# Patient Record
Sex: FEMALE | Race: WHITE | NOT HISPANIC OR LATINO | Employment: OTHER | URBAN - METROPOLITAN AREA
[De-identification: names, ages, dates, MRNs, and addresses within clinical notes are randomized per-mention and may not be internally consistent; named-entity substitution may affect disease eponyms.]

---

## 2017-01-10 ENCOUNTER — CLINIC PROCEDURE ONLY (OUTPATIENT)
Dept: URBAN - METROPOLITAN AREA CLINIC 26 | Facility: CLINIC | Age: 82
End: 2017-01-10

## 2017-01-10 DIAGNOSIS — H35.3231: ICD-10-CM

## 2017-01-10 PROCEDURE — 67028 INJECTION EYE DRUG: CPT

## 2017-01-10 ASSESSMENT — VISUAL ACUITY: OD_CC: 20/200

## 2017-01-10 ASSESSMENT — TONOMETRY
OD_IOP_MMHG: 11
OS_IOP_MMHG: 12

## 2017-02-13 ENCOUNTER — CLINIC PROCEDURE ONLY (OUTPATIENT)
Dept: URBAN - METROPOLITAN AREA CLINIC 26 | Facility: CLINIC | Age: 82
End: 2017-02-13

## 2017-02-13 DIAGNOSIS — H35.3231: ICD-10-CM

## 2017-02-13 PROCEDURE — 67028 INJECTION EYE DRUG: CPT

## 2017-02-13 ASSESSMENT — TONOMETRY: OD_IOP_MMHG: 13

## 2017-02-13 ASSESSMENT — VISUAL ACUITY: OD_CC: 20/200

## 2017-03-21 ENCOUNTER — FOLLOW UP (OUTPATIENT)
Dept: URBAN - METROPOLITAN AREA CLINIC 26 | Facility: CLINIC | Age: 82
End: 2017-03-21

## 2017-03-21 VITALS — HEIGHT: 55 IN | DIASTOLIC BLOOD PRESSURE: 67 MMHG | HEART RATE: 71 BPM | SYSTOLIC BLOOD PRESSURE: 108 MMHG

## 2017-03-21 DIAGNOSIS — H40.9: ICD-10-CM

## 2017-03-21 DIAGNOSIS — H33.322: ICD-10-CM

## 2017-03-21 DIAGNOSIS — H43.811: ICD-10-CM

## 2017-03-21 DIAGNOSIS — H35.3231: ICD-10-CM

## 2017-03-21 DIAGNOSIS — H43.393: ICD-10-CM

## 2017-03-21 DIAGNOSIS — H57.11: ICD-10-CM

## 2017-03-21 PROCEDURE — 67028 INJECTION EYE DRUG: CPT

## 2017-03-21 PROCEDURE — 4177F TALK PT/CRGVR RE AREDS PREV: CPT

## 2017-03-21 PROCEDURE — 92235 FLUORESCEIN ANGRPH MLTIFRAME: CPT

## 2017-03-21 PROCEDURE — 92250 FUNDUS PHOTOGRAPHY W/I&R: CPT

## 2017-03-21 PROCEDURE — 4004F PT TOBACCO SCREEN RCVD TLK: CPT

## 2017-03-21 PROCEDURE — 2019F DILATED MACUL EXAM DONE: CPT

## 2017-03-21 PROCEDURE — G8427 DOCREV CUR MEDS BY ELIG CLIN: HCPCS

## 2017-03-21 PROCEDURE — 92014 COMPRE OPH EXAM EST PT 1/>: CPT

## 2017-03-21 ASSESSMENT — TONOMETRY
OS_IOP_MMHG: 14
OD_IOP_MMHG: 15

## 2017-03-21 ASSESSMENT — VISUAL ACUITY
OS_CC: 20/200
OD_CC: 20/400

## 2017-05-01 ENCOUNTER — CLINIC PROCEDURE ONLY (OUTPATIENT)
Dept: URBAN - METROPOLITAN AREA CLINIC 26 | Facility: CLINIC | Age: 82
End: 2017-05-01

## 2017-05-01 DIAGNOSIS — H35.3231: ICD-10-CM

## 2017-05-01 PROCEDURE — 67028 INJECTION EYE DRUG: CPT

## 2017-05-01 ASSESSMENT — VISUAL ACUITY: OD_CC: 20/400

## 2017-05-01 ASSESSMENT — TONOMETRY: OD_IOP_MMHG: 14

## 2017-10-31 ENCOUNTER — FOLLOW UP (OUTPATIENT)
Dept: URBAN - METROPOLITAN AREA CLINIC 26 | Facility: CLINIC | Age: 82
End: 2017-10-31

## 2017-10-31 VITALS — HEART RATE: 61 BPM | HEIGHT: 55 IN | SYSTOLIC BLOOD PRESSURE: 105 MMHG | DIASTOLIC BLOOD PRESSURE: 75 MMHG

## 2017-10-31 DIAGNOSIS — H43.393: ICD-10-CM

## 2017-10-31 DIAGNOSIS — H40.9: ICD-10-CM

## 2017-10-31 DIAGNOSIS — H35.3231: ICD-10-CM

## 2017-10-31 DIAGNOSIS — H33.322: ICD-10-CM

## 2017-10-31 DIAGNOSIS — H57.11: ICD-10-CM

## 2017-10-31 DIAGNOSIS — H43.811: ICD-10-CM

## 2017-10-31 PROCEDURE — 92134 CPTRZ OPH DX IMG PST SGM RTA: CPT

## 2017-10-31 PROCEDURE — 4177F TALK PT/CRGVR RE AREDS PREV: CPT

## 2017-10-31 PROCEDURE — G8427 DOCREV CUR MEDS BY ELIG CLIN: HCPCS

## 2017-10-31 PROCEDURE — 2019F DILATED MACUL EXAM DONE: CPT

## 2017-10-31 PROCEDURE — 67028 INJECTION EYE DRUG: CPT

## 2017-10-31 PROCEDURE — 92250 FUNDUS PHOTOGRAPHY W/I&R: CPT

## 2017-10-31 PROCEDURE — 4004F PT TOBACCO SCREEN RCVD TLK: CPT

## 2017-10-31 PROCEDURE — G8420 CALC BMI NORM PARAMETERS: HCPCS

## 2017-10-31 PROCEDURE — 92235 FLUORESCEIN ANGRPH MLTIFRAME: CPT

## 2017-10-31 PROCEDURE — 92014 COMPRE OPH EXAM EST PT 1/>: CPT

## 2017-10-31 ASSESSMENT — TONOMETRY
OD_IOP_MMHG: 10
OS_IOP_MMHG: 9

## 2017-10-31 ASSESSMENT — VISUAL ACUITY
OS_CC: 20/200-2
OD_CC: 20/400-2

## 2017-12-06 ENCOUNTER — CLINIC PROCEDURE ONLY (OUTPATIENT)
Dept: URBAN - METROPOLITAN AREA CLINIC 26 | Facility: CLINIC | Age: 82
End: 2017-12-06

## 2017-12-06 DIAGNOSIS — H35.3231: ICD-10-CM

## 2017-12-06 PROCEDURE — 67028 INJECTION EYE DRUG: CPT

## 2017-12-06 ASSESSMENT — VISUAL ACUITY: OD_CC: 20/400

## 2017-12-06 ASSESSMENT — TONOMETRY: OD_IOP_MMHG: 14

## 2018-01-11 ENCOUNTER — CLINIC PROCEDURE ONLY (OUTPATIENT)
Dept: URBAN - METROPOLITAN AREA CLINIC 26 | Facility: CLINIC | Age: 83
End: 2018-01-11

## 2018-01-11 DIAGNOSIS — H35.3231: ICD-10-CM

## 2018-01-11 PROCEDURE — 67028 INJECTION EYE DRUG: CPT

## 2018-01-11 ASSESSMENT — VISUAL ACUITY: OD_CC: 20/400-1

## 2018-01-11 ASSESSMENT — TONOMETRY: OD_IOP_MMHG: 14

## 2018-02-12 ENCOUNTER — CLINIC PROCEDURE ONLY (OUTPATIENT)
Dept: URBAN - METROPOLITAN AREA CLINIC 26 | Facility: CLINIC | Age: 83
End: 2018-02-12

## 2018-02-12 DIAGNOSIS — H35.3231: ICD-10-CM

## 2018-02-12 PROCEDURE — 67028 INJECTION EYE DRUG: CPT

## 2018-02-12 ASSESSMENT — TONOMETRY
OS_IOP_MMHG: 11
OD_IOP_MMHG: 11

## 2018-02-12 ASSESSMENT — VISUAL ACUITY: OD_SC: 20/400

## 2018-04-03 ENCOUNTER — CLINIC PROCEDURE ONLY (OUTPATIENT)
Dept: URBAN - METROPOLITAN AREA CLINIC 26 | Facility: CLINIC | Age: 83
End: 2018-04-03

## 2018-04-03 DIAGNOSIS — H35.3231: ICD-10-CM

## 2018-04-03 PROCEDURE — 67028 INJECTION EYE DRUG: CPT

## 2018-04-03 ASSESSMENT — TONOMETRY
OS_IOP_MMHG: 12
OD_IOP_MMHG: 12

## 2018-04-03 ASSESSMENT — VISUAL ACUITY: OD_CC: 20/400

## 2018-05-10 ENCOUNTER — FOLLOW UP AND POST INJECTION EVALUATION (OUTPATIENT)
Dept: URBAN - METROPOLITAN AREA CLINIC 26 | Facility: CLINIC | Age: 83
End: 2018-05-10

## 2018-05-10 VITALS — WEIGHT: 139 LBS | HEIGHT: 69 IN | BODY MASS INDEX: 20.59 KG/M2

## 2018-05-10 DIAGNOSIS — H35.3231: ICD-10-CM

## 2018-05-10 DIAGNOSIS — H43.393: ICD-10-CM

## 2018-05-10 DIAGNOSIS — H04.123: ICD-10-CM

## 2018-05-10 DIAGNOSIS — H02.836: ICD-10-CM

## 2018-05-10 DIAGNOSIS — H33.322: ICD-10-CM

## 2018-05-10 DIAGNOSIS — H40.9: ICD-10-CM

## 2018-05-10 DIAGNOSIS — H02.833: ICD-10-CM

## 2018-05-10 DIAGNOSIS — H43.811: ICD-10-CM

## 2018-05-10 PROCEDURE — 92014 COMPRE OPH EXAM EST PT 1/>: CPT

## 2018-05-10 PROCEDURE — 92250 FUNDUS PHOTOGRAPHY W/I&R: CPT

## 2018-05-10 PROCEDURE — 92134 CPTRZ OPH DX IMG PST SGM RTA: CPT

## 2018-05-10 PROCEDURE — 67028 INJECTION EYE DRUG: CPT

## 2018-05-10 ASSESSMENT — TONOMETRY
OD_IOP_MMHG: 14
OS_IOP_MMHG: 14

## 2018-05-10 ASSESSMENT — VISUAL ACUITY
OS_CC: 20/200-1
OD_CC: 20/400+1

## 2018-11-13 ENCOUNTER — FOLLOW UP (OUTPATIENT)
Dept: URBAN - METROPOLITAN AREA CLINIC 26 | Facility: CLINIC | Age: 83
End: 2018-11-13

## 2018-11-13 VITALS — DIASTOLIC BLOOD PRESSURE: 80 MMHG | HEART RATE: 71 BPM | HEIGHT: 55 IN | SYSTOLIC BLOOD PRESSURE: 110 MMHG

## 2018-11-13 DIAGNOSIS — H43.811: ICD-10-CM

## 2018-11-13 DIAGNOSIS — H35.3231: ICD-10-CM

## 2018-11-13 DIAGNOSIS — H43.393: ICD-10-CM

## 2018-11-13 DIAGNOSIS — H02.836: ICD-10-CM

## 2018-11-13 DIAGNOSIS — H40.9: ICD-10-CM

## 2018-11-13 DIAGNOSIS — H33.322: ICD-10-CM

## 2018-11-13 DIAGNOSIS — H04.123: ICD-10-CM

## 2018-11-13 DIAGNOSIS — H02.833: ICD-10-CM

## 2018-11-13 PROCEDURE — 67028 INJECTION EYE DRUG: CPT

## 2018-11-13 PROCEDURE — 92235 FLUORESCEIN ANGRPH MLTIFRAME: CPT

## 2018-11-13 PROCEDURE — 92014 COMPRE OPH EXAM EST PT 1/>: CPT

## 2018-11-13 PROCEDURE — 92250 FUNDUS PHOTOGRAPHY W/I&R: CPT

## 2018-11-13 PROCEDURE — 92134 CPTRZ OPH DX IMG PST SGM RTA: CPT

## 2018-11-13 ASSESSMENT — VISUAL ACUITY
OD_CC: 20/400-2
OS_CC: 20/400+2

## 2018-11-13 ASSESSMENT — TONOMETRY
OD_IOP_MMHG: 12
OS_IOP_MMHG: 12

## 2018-12-18 ENCOUNTER — CLINICAL PROCEDURE AND DIAGNOSTIC TESTING ONLY (OUTPATIENT)
Dept: URBAN - METROPOLITAN AREA CLINIC 26 | Facility: CLINIC | Age: 83
End: 2018-12-18

## 2018-12-18 DIAGNOSIS — H35.3231: ICD-10-CM

## 2018-12-18 PROCEDURE — 67028 INJECTION EYE DRUG: CPT

## 2018-12-18 PROCEDURE — 92250 FUNDUS PHOTOGRAPHY W/I&R: CPT

## 2018-12-18 ASSESSMENT — TONOMETRY
OD_IOP_MMHG: 12
OS_IOP_MMHG: 13

## 2018-12-18 ASSESSMENT — VISUAL ACUITY: OD_CC: 20/400

## 2019-01-22 ENCOUNTER — CLINICAL PROCEDURE AND DIAGNOSTIC TESTING ONLY (OUTPATIENT)
Dept: URBAN - METROPOLITAN AREA CLINIC 26 | Facility: CLINIC | Age: 84
End: 2019-01-22

## 2019-01-22 DIAGNOSIS — H35.3231: ICD-10-CM

## 2019-01-22 PROCEDURE — 92134 CPTRZ OPH DX IMG PST SGM RTA: CPT

## 2019-01-22 PROCEDURE — 67028 INJECTION EYE DRUG: CPT

## 2019-01-22 ASSESSMENT — TONOMETRY
OS_IOP_MMHG: 14
OD_IOP_MMHG: 14

## 2019-01-22 ASSESSMENT — VISUAL ACUITY: OD_CC: 20/400

## 2019-02-28 ENCOUNTER — CLINICAL PROCEDURE AND DIAGNOSTIC TESTING ONLY (OUTPATIENT)
Dept: URBAN - METROPOLITAN AREA CLINIC 26 | Facility: CLINIC | Age: 84
End: 2019-02-28

## 2019-02-28 DIAGNOSIS — H35.3231: ICD-10-CM

## 2019-02-28 PROCEDURE — 67028 INJECTION EYE DRUG: CPT

## 2019-02-28 PROCEDURE — 92250 FUNDUS PHOTOGRAPHY W/I&R: CPT

## 2019-02-28 ASSESSMENT — VISUAL ACUITY: OD_CC: 20/400-1

## 2019-02-28 ASSESSMENT — TONOMETRY
OD_IOP_MMHG: 14
OS_IOP_MMHG: 15

## 2019-04-04 ENCOUNTER — FOLLOW UP AND POST INJECTION EVALUATION (OUTPATIENT)
Dept: URBAN - METROPOLITAN AREA CLINIC 26 | Facility: CLINIC | Age: 84
End: 2019-04-04

## 2019-04-04 VITALS — BODY MASS INDEX: 19.55 KG/M2 | HEIGHT: 69 IN | WEIGHT: 132 LBS

## 2019-04-04 DIAGNOSIS — H43.393: ICD-10-CM

## 2019-04-04 DIAGNOSIS — H35.3231: ICD-10-CM

## 2019-04-04 DIAGNOSIS — H43.811: ICD-10-CM

## 2019-04-04 DIAGNOSIS — H33.322: ICD-10-CM

## 2019-04-04 DIAGNOSIS — H40.9: ICD-10-CM

## 2019-04-04 PROCEDURE — 92134 CPTRZ OPH DX IMG PST SGM RTA: CPT

## 2019-04-04 PROCEDURE — 92250 FUNDUS PHOTOGRAPHY W/I&R: CPT

## 2019-04-04 PROCEDURE — 67028 INJECTION EYE DRUG: CPT

## 2019-04-04 PROCEDURE — 92014 COMPRE OPH EXAM EST PT 1/>: CPT

## 2019-04-04 ASSESSMENT — TONOMETRY
OS_IOP_MMHG: 14
OD_IOP_MMHG: 12

## 2019-04-04 ASSESSMENT — VISUAL ACUITY
OS_CC: 20/400
OD_CC: 20/400

## 2019-05-08 ENCOUNTER — CLINICAL PROCEDURE AND DIAGNOSTIC TESTING ONLY (OUTPATIENT)
Dept: URBAN - METROPOLITAN AREA CLINIC 26 | Facility: CLINIC | Age: 84
End: 2019-05-08

## 2019-05-08 DIAGNOSIS — H35.3231: ICD-10-CM

## 2019-05-08 PROCEDURE — 67028 INJECTION EYE DRUG: CPT

## 2019-05-08 PROCEDURE — 92250 FUNDUS PHOTOGRAPHY W/I&R: CPT

## 2019-05-08 ASSESSMENT — VISUAL ACUITY: OD_CC: 20/400

## 2019-05-08 ASSESSMENT — TONOMETRY
OD_IOP_MMHG: 13
OS_IOP_MMHG: 12

## 2019-10-30 ENCOUNTER — FOLLOW UP (OUTPATIENT)
Dept: URBAN - METROPOLITAN AREA CLINIC 26 | Facility: CLINIC | Age: 84
End: 2019-10-30

## 2019-10-30 VITALS — DIASTOLIC BLOOD PRESSURE: 77 MMHG | SYSTOLIC BLOOD PRESSURE: 119 MMHG | HEIGHT: 55 IN | HEART RATE: 69 BPM

## 2019-10-30 DIAGNOSIS — H33.322: ICD-10-CM

## 2019-10-30 DIAGNOSIS — H40.9: ICD-10-CM

## 2019-10-30 DIAGNOSIS — H35.3231: ICD-10-CM

## 2019-10-30 DIAGNOSIS — H43.393: ICD-10-CM

## 2019-10-30 DIAGNOSIS — H43.811: ICD-10-CM

## 2019-10-30 PROCEDURE — 92250 FUNDUS PHOTOGRAPHY W/I&R: CPT

## 2019-10-30 PROCEDURE — 67028 INJECTION EYE DRUG: CPT

## 2019-10-30 PROCEDURE — 92235 FLUORESCEIN ANGRPH MLTIFRAME: CPT

## 2019-10-30 PROCEDURE — 92134 CPTRZ OPH DX IMG PST SGM RTA: CPT

## 2019-10-30 PROCEDURE — 92014 COMPRE OPH EXAM EST PT 1/>: CPT

## 2019-10-30 ASSESSMENT — VISUAL ACUITY
OD_CC: 20/400
OS_CC: 20/400+2

## 2019-10-30 ASSESSMENT — TONOMETRY
OD_IOP_MMHG: 14
OS_IOP_MMHG: 13

## 2019-12-11 ENCOUNTER — CLINICAL PROCEDURE AND DIAGNOSTIC TESTING ONLY (OUTPATIENT)
Dept: URBAN - METROPOLITAN AREA CLINIC 26 | Facility: CLINIC | Age: 84
End: 2019-12-11

## 2019-12-11 DIAGNOSIS — H35.3231: ICD-10-CM

## 2019-12-11 PROCEDURE — 92250 FUNDUS PHOTOGRAPHY W/I&R: CPT

## 2019-12-11 PROCEDURE — 67028 INJECTION EYE DRUG: CPT

## 2019-12-11 ASSESSMENT — TONOMETRY: OD_IOP_MMHG: 13

## 2019-12-11 ASSESSMENT — VISUAL ACUITY: OD_CC: 20/400

## 2020-01-22 ENCOUNTER — CLINICAL PROCEDURE AND DIAGNOSTIC TESTING ONLY (OUTPATIENT)
Dept: URBAN - METROPOLITAN AREA CLINIC 26 | Facility: CLINIC | Age: 85
End: 2020-01-22

## 2020-01-22 DIAGNOSIS — H35.3231: ICD-10-CM

## 2020-01-22 PROCEDURE — 67028 INJECTION EYE DRUG: CPT

## 2020-01-22 PROCEDURE — 92250 FUNDUS PHOTOGRAPHY W/I&R: CPT

## 2020-01-22 ASSESSMENT — TONOMETRY
OS_IOP_MMHG: 16
OD_IOP_MMHG: 19

## 2020-01-22 ASSESSMENT — VISUAL ACUITY: OD_CC: 20/400+2

## 2020-03-04 ENCOUNTER — FOLLOW UP AND POST INJECTION EVALUATION (OUTPATIENT)
Dept: URBAN - METROPOLITAN AREA CLINIC 26 | Facility: CLINIC | Age: 85
End: 2020-03-04

## 2020-03-04 DIAGNOSIS — H35.3231: ICD-10-CM

## 2020-03-04 PROCEDURE — 67028 INJECTION EYE DRUG: CPT

## 2020-03-04 PROCEDURE — 92250 FUNDUS PHOTOGRAPHY W/I&R: CPT

## 2020-03-04 PROCEDURE — 92134 CPTRZ OPH DX IMG PST SGM RTA: CPT

## 2020-03-04 ASSESSMENT — TONOMETRY
OS_IOP_MMHG: 9
OD_IOP_MMHG: 10

## 2020-03-04 ASSESSMENT — VISUAL ACUITY: OD_SC: 20/400

## 2020-04-20 ENCOUNTER — CLINICAL PROCEDURE AND DIAGNOSTIC TESTING ONLY (OUTPATIENT)
Dept: URBAN - METROPOLITAN AREA CLINIC 26 | Facility: CLINIC | Age: 85
End: 2020-04-20

## 2020-04-20 DIAGNOSIS — H35.3231: ICD-10-CM

## 2020-04-20 PROCEDURE — 92250 FUNDUS PHOTOGRAPHY W/I&R: CPT

## 2020-04-20 PROCEDURE — 67028 INJECTION EYE DRUG: CPT

## 2020-04-20 PROCEDURE — 92134 CPTRZ OPH DX IMG PST SGM RTA: CPT

## 2020-04-20 ASSESSMENT — VISUAL ACUITY: OD_CC: 20/400

## 2020-04-20 ASSESSMENT — TONOMETRY
OS_IOP_MMHG: 11
OD_IOP_MMHG: 12

## 2020-11-12 ENCOUNTER — FOLLOW UP AND POST INJECTION EVALUATION (OUTPATIENT)
Dept: URBAN - METROPOLITAN AREA CLINIC 26 | Facility: CLINIC | Age: 85
End: 2020-11-12

## 2020-11-12 VITALS — BODY MASS INDEX: 23.75 KG/M2 | HEIGHT: 60 IN | WEIGHT: 121 LBS

## 2020-11-12 DIAGNOSIS — H40.9: ICD-10-CM

## 2020-11-12 DIAGNOSIS — H43.811: ICD-10-CM

## 2020-11-12 DIAGNOSIS — H33.322: ICD-10-CM

## 2020-11-12 DIAGNOSIS — H43.393: ICD-10-CM

## 2020-11-12 DIAGNOSIS — H35.3231: ICD-10-CM

## 2020-11-12 PROCEDURE — 92250 FUNDUS PHOTOGRAPHY W/I&R: CPT

## 2020-11-12 PROCEDURE — 92014 COMPRE OPH EXAM EST PT 1/>: CPT

## 2020-11-12 PROCEDURE — 92134 CPTRZ OPH DX IMG PST SGM RTA: CPT

## 2020-11-12 PROCEDURE — 67028 INJECTION EYE DRUG: CPT

## 2020-11-12 ASSESSMENT — TONOMETRY
OS_IOP_MMHG: 16
OD_IOP_MMHG: 17

## 2020-11-12 ASSESSMENT — VISUAL ACUITY
OS_CC: 20/400-1
OD_CC: 20/400

## 2020-12-30 ENCOUNTER — CLINICAL PROCEDURE AND DIAGNOSTIC TESTING ONLY (OUTPATIENT)
Dept: URBAN - METROPOLITAN AREA CLINIC 26 | Facility: CLINIC | Age: 85
End: 2020-12-30

## 2020-12-30 DIAGNOSIS — H35.3231: ICD-10-CM

## 2020-12-30 PROCEDURE — 92250 FUNDUS PHOTOGRAPHY W/I&R: CPT

## 2020-12-30 PROCEDURE — 92134 CPTRZ OPH DX IMG PST SGM RTA: CPT

## 2020-12-30 PROCEDURE — 67028 INJECTION EYE DRUG: CPT

## 2020-12-30 ASSESSMENT — VISUAL ACUITY: OD_CC: 20/400

## 2020-12-30 ASSESSMENT — TONOMETRY
OD_IOP_MMHG: 14
OS_IOP_MMHG: 16

## 2021-02-24 ENCOUNTER — CLINICAL PROCEDURE AND DIAGNOSTIC TESTING ONLY (OUTPATIENT)
Dept: URBAN - METROPOLITAN AREA CLINIC 26 | Facility: CLINIC | Age: 86
End: 2021-02-24

## 2021-02-24 DIAGNOSIS — H35.3231: ICD-10-CM

## 2021-02-24 PROCEDURE — 92250 FUNDUS PHOTOGRAPHY W/I&R: CPT

## 2021-02-24 PROCEDURE — 92134 CPTRZ OPH DX IMG PST SGM RTA: CPT

## 2021-02-24 PROCEDURE — 67028 INJECTION EYE DRUG: CPT

## 2021-02-24 ASSESSMENT — VISUAL ACUITY: OD_CC: 20/400

## 2021-02-24 ASSESSMENT — TONOMETRY
OS_IOP_MMHG: 14
OD_IOP_MMHG: 13

## 2021-04-28 ENCOUNTER — FOLLOW UP AND POST INJECTION EVALUATION (OUTPATIENT)
Dept: URBAN - METROPOLITAN AREA CLINIC 26 | Facility: CLINIC | Age: 86
End: 2021-04-28

## 2021-04-28 VITALS — BODY MASS INDEX: 17.9 KG/M2 | WEIGHT: 125 LBS | HEIGHT: 70 IN

## 2021-04-28 DIAGNOSIS — H40.9: ICD-10-CM

## 2021-04-28 DIAGNOSIS — H43.393: ICD-10-CM

## 2021-04-28 DIAGNOSIS — H43.811: ICD-10-CM

## 2021-04-28 DIAGNOSIS — H33.322: ICD-10-CM

## 2021-04-28 DIAGNOSIS — H35.3231: ICD-10-CM

## 2021-04-28 PROCEDURE — 92250 FUNDUS PHOTOGRAPHY W/I&R: CPT

## 2021-04-28 PROCEDURE — 92134 CPTRZ OPH DX IMG PST SGM RTA: CPT

## 2021-04-28 PROCEDURE — 67028 INJECTION EYE DRUG: CPT

## 2021-04-28 PROCEDURE — 92014 COMPRE OPH EXAM EST PT 1/>: CPT

## 2021-04-28 ASSESSMENT — TONOMETRY
OD_IOP_MMHG: 14
OS_IOP_MMHG: 13

## 2021-04-28 ASSESSMENT — VISUAL ACUITY
OD_CC: 20/400
OS_CC: 20/400

## 2021-12-13 ENCOUNTER — FOLLOW UP (OUTPATIENT)
Dept: URBAN - METROPOLITAN AREA CLINIC 26 | Facility: CLINIC | Age: 86
End: 2021-12-13

## 2021-12-13 DIAGNOSIS — H43.393: ICD-10-CM

## 2021-12-13 DIAGNOSIS — H35.3211: ICD-10-CM

## 2021-12-13 DIAGNOSIS — H40.9: ICD-10-CM

## 2021-12-13 DIAGNOSIS — H43.811: ICD-10-CM

## 2021-12-13 DIAGNOSIS — H04.123: ICD-10-CM

## 2021-12-13 DIAGNOSIS — H33.322: ICD-10-CM

## 2021-12-13 DIAGNOSIS — H35.3221: ICD-10-CM

## 2021-12-13 PROCEDURE — 92250 FUNDUS PHOTOGRAPHY W/I&R: CPT

## 2021-12-13 PROCEDURE — 92014 COMPRE OPH EXAM EST PT 1/>: CPT

## 2021-12-13 PROCEDURE — 92134 CPTRZ OPH DX IMG PST SGM RTA: CPT

## 2021-12-13 PROCEDURE — 67028 INJECTION EYE DRUG: CPT

## 2021-12-13 ASSESSMENT — TONOMETRY
OD_IOP_MMHG: 10
OS_IOP_MMHG: 13

## 2021-12-13 ASSESSMENT — VISUAL ACUITY
OD_CC: 20/400-1
OS_CC: 20/400

## 2022-02-15 ENCOUNTER — CLINIC PROCEDURE ONLY (OUTPATIENT)
Dept: URBAN - METROPOLITAN AREA CLINIC 26 | Facility: CLINIC | Age: 87
End: 2022-02-15

## 2022-02-15 DIAGNOSIS — H35.3211: ICD-10-CM

## 2022-02-15 DIAGNOSIS — H35.3221: ICD-10-CM

## 2022-02-15 DIAGNOSIS — H33.322: ICD-10-CM

## 2022-02-15 PROCEDURE — 92250 FUNDUS PHOTOGRAPHY W/I&R: CPT

## 2022-02-15 PROCEDURE — 92134 CPTRZ OPH DX IMG PST SGM RTA: CPT

## 2022-02-15 PROCEDURE — 67028 INJECTION EYE DRUG: CPT

## 2022-02-15 ASSESSMENT — TONOMETRY
OD_IOP_MMHG: 14
OS_IOP_MMHG: 17

## 2022-04-26 ENCOUNTER — CLINIC PROCEDURE ONLY (OUTPATIENT)
Dept: URBAN - METROPOLITAN AREA CLINIC 26 | Facility: CLINIC | Age: 87
End: 2022-04-26

## 2022-04-26 DIAGNOSIS — H35.3231: ICD-10-CM

## 2022-04-26 DIAGNOSIS — H40.9: ICD-10-CM

## 2022-04-26 PROCEDURE — 92250 FUNDUS PHOTOGRAPHY W/I&R: CPT

## 2022-04-26 PROCEDURE — 92134 CPTRZ OPH DX IMG PST SGM RTA: CPT

## 2022-04-26 PROCEDURE — 67028 INJECTION EYE DRUG: CPT

## 2022-04-26 ASSESSMENT — TONOMETRY
OS_IOP_MMHG: 14
OD_IOP_MMHG: 15

## 2022-11-21 ENCOUNTER — FOLLOW UP (OUTPATIENT)
Dept: URBAN - METROPOLITAN AREA CLINIC 26 | Facility: CLINIC | Age: 87
End: 2022-11-21

## 2022-11-21 VITALS — WEIGHT: 111 LBS | HEIGHT: 70 IN | BODY MASS INDEX: 15.89 KG/M2

## 2022-11-21 DIAGNOSIS — H40.9: ICD-10-CM

## 2022-11-21 DIAGNOSIS — H43.393: ICD-10-CM

## 2022-11-21 DIAGNOSIS — H35.3231: ICD-10-CM

## 2022-11-21 DIAGNOSIS — H43.811: ICD-10-CM

## 2022-11-21 DIAGNOSIS — H33.322: ICD-10-CM

## 2022-11-21 DIAGNOSIS — H04.123: ICD-10-CM

## 2022-11-21 PROCEDURE — 92014 COMPRE OPH EXAM EST PT 1/>: CPT

## 2022-11-21 PROCEDURE — 92250 FUNDUS PHOTOGRAPHY W/I&R: CPT

## 2022-11-21 PROCEDURE — 67028 INJECTION EYE DRUG: CPT

## 2022-11-21 PROCEDURE — 92134 CPTRZ OPH DX IMG PST SGM RTA: CPT

## 2022-11-21 PROCEDURE — 67220 TREATMENT OF CHOROID LESION: CPT

## 2022-11-21 ASSESSMENT — TONOMETRY
OD_IOP_MMHG: 13
OS_IOP_MMHG: 13

## 2022-11-21 ASSESSMENT — VISUAL ACUITY
OD_SC: 20/400
OS_SC: 20/400-2

## 2022-11-22 ENCOUNTER — CLINIC PROCEDURE ONLY (OUTPATIENT)
Dept: URBAN - METROPOLITAN AREA CLINIC 26 | Facility: CLINIC | Age: 87
End: 2022-11-22

## 2022-11-22 DIAGNOSIS — H35.3231: ICD-10-CM

## 2022-11-22 PROCEDURE — 67028 INJECTION EYE DRUG: CPT

## 2022-11-22 ASSESSMENT — TONOMETRY: OS_IOP_MMHG: 13

## 2022-12-09 ENCOUNTER — WEB ENCOUNTER (OUTPATIENT)
Dept: URBAN - METROPOLITAN AREA CLINIC 63 | Facility: CLINIC | Age: 87
End: 2022-12-09

## 2022-12-09 ENCOUNTER — OFFICE VISIT (OUTPATIENT)
Dept: URBAN - METROPOLITAN AREA CLINIC 63 | Facility: CLINIC | Age: 87
End: 2022-12-09
Payer: MEDICARE

## 2022-12-09 VITALS
BODY MASS INDEX: 16.82 KG/M2 | DIASTOLIC BLOOD PRESSURE: 92 MMHG | OXYGEN SATURATION: 97 % | TEMPERATURE: 96.7 F | WEIGHT: 111 LBS | SYSTOLIC BLOOD PRESSURE: 148 MMHG | HEIGHT: 68 IN | HEART RATE: 87 BPM

## 2022-12-09 DIAGNOSIS — R13.19 OTHER DYSPHAGIA: ICD-10-CM

## 2022-12-09 PROCEDURE — 99203 OFFICE O/P NEW LOW 30 MIN: CPT | Performed by: INTERNAL MEDICINE

## 2022-12-09 RX ORDER — OMEPRAZOLE 20 MG/1
CAPSULE, DELAYED RELEASE ORAL
Qty: 90 CAPSULE | Status: ACTIVE | COMMUNITY

## 2022-12-09 RX ORDER — LISINOPRIL 40 MG/1
TABLET ORAL
Qty: 90 TABLET | Status: ACTIVE | COMMUNITY

## 2022-12-09 RX ORDER — KRILL/OM-3/DHA/EPA/PHOSPHO/AST 1000-230MG
1 TABLET CAPSULE ORAL ONCE A DAY
Status: ACTIVE | COMMUNITY

## 2022-12-09 RX ORDER — LATANOPROST 50 UG/ML
SOLUTION OPHTHALMIC
Qty: 12.5 MILLILITER | Status: ACTIVE | COMMUNITY

## 2022-12-09 RX ORDER — CLOTRIMAZOLE AND BETAMETHASONE DIPROPIONATE 10; .5 MG/G; MG/G
CREAM TOPICAL
Qty: 45 GRAM | Status: ACTIVE | COMMUNITY

## 2022-12-09 RX ORDER — DORZOLAMIDE HYDROCHLORIDE AND TIMOLOL MALEATE 20; 5 MG/ML; MG/ML
SOLUTION OPHTHALMIC
Qty: 30 MILLILITER | Status: ACTIVE | COMMUNITY

## 2022-12-09 NOTE — HPI-TODAY'S VISIT:
Patient here as was referred by her local PCP with regards to her barium esophagogram. This is a 89-year-old lady who had a couple of episodes of choking mainly when drinking liquids. She had a barium swallow which showed a normal esophagus, trace penetration suspicious for aspiration, further study with speech pathology was recommended. According to the patient, she has no real problems with swallowing. No solid food dysphagia reported. She does choke on water if she drinks too quickly. Appetite has been fair, weight has been steady. She did initially lose some weight but now it is steady, unable to gaining weight. No other GI complaints reported.

## 2022-12-28 ENCOUNTER — CLINIC PROCEDURE ONLY (OUTPATIENT)
Dept: URBAN - METROPOLITAN AREA CLINIC 26 | Facility: CLINIC | Age: 87
End: 2022-12-28

## 2022-12-28 PROCEDURE — 92134 CPTRZ OPH DX IMG PST SGM RTA: CPT

## 2022-12-28 PROCEDURE — 92250 FUNDUS PHOTOGRAPHY W/I&R: CPT

## 2022-12-28 PROCEDURE — 67028 INJECTION EYE DRUG: CPT

## 2022-12-28 ASSESSMENT — TONOMETRY
OD_IOP_MMHG: 16
OS_IOP_MMHG: 16

## 2023-02-14 ENCOUNTER — DASHBOARD ENCOUNTERS (OUTPATIENT)
Age: 88
End: 2023-02-14

## 2023-02-14 ENCOUNTER — WEB ENCOUNTER (OUTPATIENT)
Dept: URBAN - METROPOLITAN AREA CLINIC 63 | Facility: CLINIC | Age: 88
End: 2023-02-14

## 2023-02-14 ENCOUNTER — OFFICE VISIT (OUTPATIENT)
Dept: URBAN - METROPOLITAN AREA CLINIC 63 | Facility: CLINIC | Age: 88
End: 2023-02-14
Payer: MEDICARE

## 2023-02-14 VITALS
DIASTOLIC BLOOD PRESSURE: 80 MMHG | WEIGHT: 108 LBS | BODY MASS INDEX: 16.37 KG/M2 | TEMPERATURE: 98.5 F | SYSTOLIC BLOOD PRESSURE: 126 MMHG | OXYGEN SATURATION: 96 % | HEIGHT: 68 IN | HEART RATE: 87 BPM

## 2023-02-14 DIAGNOSIS — K30 FUNCTIONAL DYSPEPSIA: ICD-10-CM

## 2023-02-14 DIAGNOSIS — R19.8 BORBORYGMI: ICD-10-CM

## 2023-02-14 DIAGNOSIS — K59.04 CHRONIC IDIOPATHIC CONSTIPATION: ICD-10-CM

## 2023-02-14 PROBLEM — 82934008: Status: ACTIVE | Noted: 2023-02-14

## 2023-02-14 PROBLEM — 3696007: Status: ACTIVE | Noted: 2023-02-14

## 2023-02-14 PROCEDURE — 99213 OFFICE O/P EST LOW 20 MIN: CPT | Performed by: NURSE PRACTITIONER

## 2023-02-14 RX ORDER — OMEPRAZOLE 20 MG/1
1 CAPSULE 30 MINUTES BEFORE MORNING MEAL CAPSULE, DELAYED RELEASE ORAL ONCE A DAY
Qty: 90 CAPSULE | Refills: 3 | OUTPATIENT
Start: 2023-02-14

## 2023-02-14 RX ORDER — LISINOPRIL 40 MG/1
TABLET ORAL
Qty: 90 TABLET | Status: ACTIVE | COMMUNITY

## 2023-02-14 RX ORDER — DORZOLAMIDE HYDROCHLORIDE AND TIMOLOL MALEATE 20; 5 MG/ML; MG/ML
SOLUTION OPHTHALMIC
Qty: 30 MILLILITER | Status: ACTIVE | COMMUNITY

## 2023-02-14 RX ORDER — LATANOPROST 50 UG/ML
SOLUTION OPHTHALMIC
Qty: 12.5 MILLILITER | Status: ACTIVE | COMMUNITY

## 2023-02-14 RX ORDER — OMEPRAZOLE 20 MG/1
CAPSULE, DELAYED RELEASE ORAL
Qty: 90 CAPSULE | Status: ACTIVE | COMMUNITY

## 2023-02-14 RX ORDER — CLOTRIMAZOLE AND BETAMETHASONE DIPROPIONATE 10; .5 MG/G; MG/G
CREAM TOPICAL
Qty: 45 GRAM | Status: ACTIVE | COMMUNITY

## 2023-02-14 RX ORDER — KRILL/OM-3/DHA/EPA/PHOSPHO/AST 1000-230MG
1 TABLET CAPSULE ORAL ONCE A DAY
Status: ACTIVE | COMMUNITY

## 2023-02-14 NOTE — HPI-TODAY'S VISIT:
Ms. Camarillo is a pleasant 89-year-old female evaluated with complaint of borborygmi.  She was previously evaluated 12/09/2022 as a new patient after having a couple of episodes of choking when drinking liquids. Barium swallow study showed normal esophagus, trace penetration suspicious for aspiration, further study with speech pathology recommended. Patient stated having no real problem with swallowing, admitted choking on water if drinking too quickly. Denied dysphagia with solid foods. Was without additional GI complaint.  Patient was offered evaluation by speech pathologist, however declined. Today, she complains of her stomach rolling for about a year.  Complains of increased gas, constipation.  Having BM every 1-2 days.  Drinks only one cup of coffee in the morning and at lunch, and a martini at dinner. Consumes no water.  Has Rx for lactulose, rarely using.  Denies dysphagia.

## 2023-03-21 ENCOUNTER — OFFICE VISIT (OUTPATIENT)
Dept: URBAN - METROPOLITAN AREA CLINIC 63 | Facility: CLINIC | Age: 88
End: 2023-03-21

## 2023-03-28 ENCOUNTER — OFFICE VISIT (OUTPATIENT)
Dept: URBAN - METROPOLITAN AREA CLINIC 63 | Facility: CLINIC | Age: 88
End: 2023-03-28

## 2023-04-12 ENCOUNTER — CLINIC PROCEDURE ONLY (OUTPATIENT)
Dept: URBAN - METROPOLITAN AREA CLINIC 26 | Facility: CLINIC | Age: 88
End: 2023-04-12

## 2023-04-12 DIAGNOSIS — H35.3231: ICD-10-CM

## 2023-04-12 PROCEDURE — 67028 INJECTION EYE DRUG: CPT

## 2023-04-12 PROCEDURE — 92250 FUNDUS PHOTOGRAPHY W/I&R: CPT

## 2023-04-12 PROCEDURE — 92134 CPTRZ OPH DX IMG PST SGM RTA: CPT

## 2023-04-12 ASSESSMENT — TONOMETRY
OD_IOP_MMHG: 12
OS_IOP_MMHG: 14